# Patient Record
Sex: MALE | Race: BLACK OR AFRICAN AMERICAN | NOT HISPANIC OR LATINO | Employment: OTHER | URBAN - METROPOLITAN AREA
[De-identification: names, ages, dates, MRNs, and addresses within clinical notes are randomized per-mention and may not be internally consistent; named-entity substitution may affect disease eponyms.]

---

## 2017-03-17 ENCOUNTER — HISTORICAL (OUTPATIENT)
Dept: LAB | Facility: HOSPITAL | Age: 72
End: 2017-03-17

## 2017-08-29 ENCOUNTER — HISTORICAL (OUTPATIENT)
Dept: ADMINISTRATIVE | Facility: HOSPITAL | Age: 72
End: 2017-08-29

## 2017-08-29 LAB
ABS NEUT (OLG): 1.53 X10(3)/MCL (ref 2.1–9.2)
ALBUMIN SERPL-MCNC: 3.9 GM/DL (ref 3.4–5)
ALBUMIN/GLOB SERPL: 1.3 {RATIO}
ALP SERPL-CCNC: 71 UNIT/L (ref 50–136)
ALT SERPL-CCNC: 26 UNIT/L (ref 12–78)
AST SERPL-CCNC: 13 UNIT/L (ref 15–37)
BASOPHILS # BLD AUTO: 0 X10(3)/MCL (ref 0–0.2)
BASOPHILS NFR BLD AUTO: 0.6 %
BILIRUB SERPL-MCNC: 0.8 MG/DL (ref 0.2–1)
BILIRUBIN DIRECT+TOT PNL SERPL-MCNC: 0.2 MG/DL (ref 0–0.2)
BILIRUBIN DIRECT+TOT PNL SERPL-MCNC: 0.6 MG/DL (ref 0–0.8)
BUN SERPL-MCNC: 12 MG/DL (ref 7–18)
CALCIUM SERPL-MCNC: 9.1 MG/DL (ref 8.5–10.1)
CHLORIDE SERPL-SCNC: 106 MMOL/L (ref 98–107)
CO2 SERPL-SCNC: 29 MMOL/L (ref 21–32)
CREAT SERPL-MCNC: 1.15 MG/DL (ref 0.7–1.3)
EOSINOPHIL # BLD AUTO: 0.1 X10(3)/MCL (ref 0–0.9)
EOSINOPHIL NFR BLD AUTO: 4.3 %
EOSINOPHIL NFR BLD MANUAL: 2 % (ref 0–8)
ERYTHROCYTE [DISTWIDTH] IN BLOOD BY AUTOMATED COUNT: 13 % (ref 11.5–17)
GLOBULIN SER-MCNC: 3 GM/DL (ref 2.4–3.5)
GLUCOSE SERPL-MCNC: 103 MG/DL (ref 74–106)
HCT VFR BLD AUTO: 43.7 % (ref 42–52)
HGB BLD-MCNC: 14.9 GM/DL (ref 14–18)
LYMPHOCYTES # BLD AUTO: 1.1 X10(3)/MCL (ref 0.6–4.6)
LYMPHOCYTES NFR BLD AUTO: 35.4 %
LYMPHOCYTES NFR BLD MANUAL: 38 % (ref 13–40)
MCH RBC QN AUTO: 31.8 PG (ref 27–31)
MCHC RBC AUTO-ENTMCNC: 34.1 GM/DL (ref 33–36)
MCV RBC AUTO: 93.2 FL (ref 80–94)
MONOCYTES # BLD AUTO: 0.4 X10(3)/MCL (ref 0.1–1.3)
MONOCYTES NFR BLD AUTO: 12.1 %
MONOCYTES NFR BLD MANUAL: 10 % (ref 2–11)
NEUTROPHILS # BLD AUTO: 1.5 X10(3)/MCL (ref 2.1–9.2)
NEUTROPHILS NFR BLD AUTO: 47.6 %
NEUTROPHILS NFR BLD MANUAL: 50 % (ref 47–80)
PLATELET # BLD AUTO: 165 X10(3)/MCL (ref 130–400)
PLATELET # BLD EST: NORMAL 10*3/UL
PMV BLD AUTO: 10 FL (ref 9.4–12.4)
POTASSIUM SERPL-SCNC: 4.1 MMOL/L (ref 3.5–5.1)
PROT SERPL-MCNC: 6.9 GM/DL (ref 6.4–8.2)
RBC # BLD AUTO: 4.69 X10(6)/MCL (ref 4.7–6.1)
RBC MORPH BLD: NORMAL
SODIUM SERPL-SCNC: 142 MMOL/L (ref 136–145)
WBC # SPEC AUTO: 3.2 X10(3)/MCL (ref 4.5–11.5)

## 2017-09-06 ENCOUNTER — HISTORICAL (OUTPATIENT)
Dept: LAB | Facility: HOSPITAL | Age: 72
End: 2017-09-06

## 2017-09-06 LAB
BUN SERPL-MCNC: 17.9 MG/DL (ref 7–18)
CALCIUM SERPL-MCNC: 8.8 MG/DL (ref 8.5–10.1)
CHLORIDE SERPL-SCNC: 107 MMOL/L (ref 98–107)
CO2 SERPL-SCNC: 26.9 MMOL/L (ref 21–32)
CREAT SERPL-MCNC: 1.22 MG/DL (ref 0.6–1.3)
ERYTHROCYTE [DISTWIDTH] IN BLOOD BY AUTOMATED COUNT: 13.3 % (ref 11.5–17)
GLUCOSE SERPL-MCNC: 110 MG/DL (ref 74–106)
HCT VFR BLD AUTO: 42.3 % (ref 42–52)
HGB BLD-MCNC: 14.5 GM/DL (ref 14–18)
MCH RBC QN AUTO: 31.8 PG (ref 27–31)
MCHC RBC AUTO-ENTMCNC: 34.1 GM/DL (ref 33–36)
MCV RBC AUTO: 93 FL (ref 80–94)
PLATELET # BLD AUTO: 162 X10(3)/MCL (ref 130–400)
PMV BLD AUTO: 8.1 FL (ref 7.4–10.4)
POTASSIUM SERPL-SCNC: 4 MMOL/L (ref 3.5–5.1)
RBC # BLD AUTO: 4.55 X10(6)/MCL (ref 4.7–6.1)
SODIUM SERPL-SCNC: 143 MMOL/L (ref 136–145)
WBC # SPEC AUTO: 2.9 X10(3)/MCL (ref 4.5–11.5)

## 2017-10-06 ENCOUNTER — HOSPITAL ENCOUNTER (OUTPATIENT)
Dept: RADIOLOGY | Facility: HOSPITAL | Age: 72
Discharge: HOME | End: 2017-10-06
Attending: PHYSICAL MEDICINE & REHABILITATION
Payer: MEDICARE

## 2017-10-06 ENCOUNTER — PATIENT MESSAGE (OUTPATIENT)
Dept: PHYSICAL MEDICINE AND REHAB | Facility: CLINIC | Age: 72
End: 2017-10-06

## 2017-10-06 ENCOUNTER — OFFICE VISIT (OUTPATIENT)
Dept: PHYSICAL MEDICINE AND REHAB | Facility: CLINIC | Age: 72
End: 2017-10-06
Payer: MEDICARE

## 2017-10-06 VITALS
WEIGHT: 260 LBS | DIASTOLIC BLOOD PRESSURE: 78 MMHG | RESPIRATION RATE: 14 BRPM | BODY MASS INDEX: 33.37 KG/M2 | HEART RATE: 49 BPM | SYSTOLIC BLOOD PRESSURE: 138 MMHG | HEIGHT: 74 IN

## 2017-10-06 DIAGNOSIS — M70.50 PES ANSERINE BURSITIS: Primary | ICD-10-CM

## 2017-10-06 DIAGNOSIS — M70.50 PES ANSERINE BURSITIS: ICD-10-CM

## 2017-10-06 PROCEDURE — 99213 OFFICE O/P EST LOW 20 MIN: CPT | Mod: PBBFAC,PO,25 | Performed by: PHYSICAL MEDICINE & REHABILITATION

## 2017-10-06 PROCEDURE — 99214 OFFICE O/P EST MOD 30 MIN: CPT | Mod: S$PBB,,, | Performed by: PHYSICAL MEDICINE & REHABILITATION

## 2017-10-06 PROCEDURE — 73562 X-RAY EXAM OF KNEE 3: CPT | Mod: TC,50,PO

## 2017-10-06 PROCEDURE — 99999 PR PBB SHADOW E&M-EST. PATIENT-LVL III: CPT | Mod: PBBFAC,,, | Performed by: PHYSICAL MEDICINE & REHABILITATION

## 2017-10-06 PROCEDURE — 73562 X-RAY EXAM OF KNEE 3: CPT | Mod: 26,RT,, | Performed by: RADIOLOGY

## 2017-10-06 PROCEDURE — 73562 X-RAY EXAM OF KNEE 3: CPT | Mod: 26,LT,, | Performed by: RADIOLOGY

## 2017-10-06 RX ORDER — NABUMETONE 500 MG/1
500 TABLET, FILM COATED ORAL 2 TIMES DAILY
Qty: 60 TABLET | Refills: 1 | Status: SHIPPED | OUTPATIENT
Start: 2017-10-06 | End: 2018-09-25

## 2017-10-06 RX ORDER — CLONIDINE HYDROCHLORIDE 0.1 MG/1
TABLET ORAL
Refills: 11 | COMMUNITY
Start: 2017-09-18

## 2017-10-06 NOTE — PATIENT INSTRUCTIONS
What is bursitis?  A bursa is a fluid-filled sac that helps cushion the muscles, tendons, and bones around a joint. When a bursa becomes inflamed, its called bursitis. Common symptoms of bursitis include pain, tenderness, and swelling that limits movement of the joint.  What causes bursitis?  Bursitis is most often caused by overuse of a joint. The repeated movements irritate the bursa and may cause it to swell. When that happens, other surrounding tissues may become inflamed or have less space to move. Bursitis is most common in large joints such as the knee, shoulder, and hip.       Nonsurgical treatment involves both rest and exercise.    How is bursitis treated?  To help reduce pain and swelling, your healthcare provider may recommend one or more of the following:   · Rest gives the bursa time to heal. This means limiting activities that put stress on the joint.  · Anti-inflammatory medications help reduce painful swelling. In some cases, this can include injections of cortisone or other steroid medicines into the bursa.  · Splints and support bandages improve your comfort and allow the bursa to heal.  · Physical therapy may be used to increase flexibility and strengthen muscles that support the joint.  · Aspiration removes extra fluid from the bursa using a needle. This can help your healthcare provider find out what is causing your bursitis. For example, it might be an infection or overuse.   · Surgery can be used to remove an inflamed or infected bursa. This is rarely needed.  Date Last Reviewed: 9/11/2015  © 6326-8242 [employer]. [address] [place] [address], [place] [address]. All rights reserved. This information is not intended as a substitute for professional medical care. Always follow your healthcare professional's instructions.        Back Exercises: Knee Lift         To start, lie on your back with your knees bent and feet flat on the floor. Dont press your neck or lower back to the  floor. Breathe deeply. You should feel comfortable and relaxed in this position:  · Start by tightening your abdominal muscles.  · Lift one bent knee off the floor 2 to 4 inches.  · Hold for 10 seconds. Return to start position.  · Repeat 3 times.  · Switch legs.  Date Last Reviewed: 8/16/2015  © 5414-2559 [employer]. [address] [place] [address]. All rights reserved. This information is not intended as a substitute for professional medical care. Always follow your healthcare professional's instructions.        Quad Set for Leg and Knee    This exercise is designed to stretch and strengthen your knee. Before beginning, read through all the instructions. While exercising, breathe normally and use smooth movements. If you feel any pain, stop the exercise. If pain persists, call your healthcare provider.  1.  Sit on the floor with one leg straight, the other bent.  2.  Flex the foot of your straight leg by pointing your toes toward you. Press the back of your knee into the floor while tightening the muscle on the top of your thigh. Hold for ______ seconds. Then relax.  3.  Repeat ______ times. Do ______ sets a day.  Caution  · Dont arch your back.  · Dont hunch your shoulders.  Date Last Reviewed: 9/20/2015 © 2000-2017 [employer]. [address] [place] [address]. All rights reserved. This information is not intended as a substitute for professional medical care. Always follow your healthcare professional's instructions.        Hamstring Curls    The following exercise helps build strong, balanced leg muscles. Make sure to adjust exercise machines as instructed by your physical therapist. He or she will tell you how many times to do the exercise.  Note: To prevent injury, always warm up and stretch before your strengthening exercises. Stop any exercise that causes pain. Discuss it with your physical therapist or doctor.  · Lying on your stomach, pull one leg up as  far as you comfortably can.  · Let your leg uncurl slowly and steadily.  · Take care not to arch your back.  Date Last Reviewed: 8/16/2015 © 2000-2017 [employer]. [address] [place] [address]. All rights reserved. This information is not intended as a substitute for professional medical care. Always follow your healthcare professional's instructions.        Leg and Knee Exercises: Heel Raise    This exercise is designed to strengthen your knee and calf. Before beginning, read through all the instructions. While exercising, breathe normally and use smooth movements. If you feel any pain, stop the exercise. If pain persists, inform your healthcare provider.  Caution  · Dont lock your knees.  · Dont arch your back.   · Stand with both feet flat on the floor, shoulder-width apart.  · If you need support, steady yourself with your hand on a ledge, wall, or table.  · Raise both heels so youre standing on your toes.  · Hold for ______ seconds. Slowly lower your heels to the floor.  · Repeat ______ times. Do ______ sets a day.  Note: As you become stronger, stand on one foot at a time, and raise that heel off the floor.  Date Last Reviewed: 8/16/2015 © 2000-2017 [employer]. [address] [place] [address]. All rights reserved. This information is not intended as a substitute for professional medical care. Always follow your healthcare professional's instructions.        Leg and Knee Exercises: Leg Raise    This exercise is designed to stretch and strengthen your knee. Before beginning, read through all the instructions. While exercising, breathe normally and use smooth movements. If you feel any pain, stop the exercise. If pain persists, call your healthcare provider.  Caution  · Dont arch your back.  · Dont hunch your shoulders.   · Sit on the floor with your_________ leg straight, the other bent.  · Tighten the thigh muscles on the top of your straight leg. You  should feel the muscles contract. Raise that leg 6-8 inches. Then lower it slowly and steadily to the floor. Relax.  · Repeat ______ times.  Do ______ sets a day.  Date Last Reviewed: 8/16/2015  © 8807-5803 [employer]. [address] [place] [address]. All rights reserved. This information is not intended as a substitute for professional medical care. Always follow your healthcare professional's instructions.        Gastrocnemius Stretch (Flexibility)    1. Stand facing a wall from 3 feet away. Take one step toward the wall with your right foot.  2. Place both palms on the wall. Bend your right knee.  3. Lean forward, keeping the left leg straight and the left heel on the floor.  4. Hold for 30 to 60 seconds. Repeat 2 times, or as instructed.  5. Switch legs and repeat.  Date Last Reviewed: 3/10/2016  © 8682-1389 [employer]. [address] [place] [address]. All rights reserved. This information is not intended as a substitute for professional medical care. Always follow your healthcare professional's instructions.        Hamstring Stretch (Flexibility)    6. Sit on the floor with your right leg straight in front of you. Bend your left leg so the sole of your foot rests against the inside of your right thigh.  7. Reach toward your ankle. Keep your knee, neck, and back straight. Feel the stretch in the back of your thigh.  8. Hold for 30 to 60 seconds. Repeat 2 times, or as instructed.  9. Switch legs and repeat.  10. Repeat this exercise 3 times per day, or as instructed.     Tip: Dont bounce while youre stretching.   Date Last Reviewed: 3/10/2016  © 3613-3269 [employer]. [address] [place] [address]. All rights reserved. This information is not intended as a substitute for professional medical care. Always follow your healthcare professional's instructions.        Heel Slides    This exercise is for an injured right knee. Switch sides if  the injury is to your left knee.  11. Sit on the floor with your legs straight in front of you.  12. Slide your right heel along the floor toward you, bending your knee and keeping your foot flexed.  13. Move it as close to you as you comfortably can. Hold for 5 to 10 seconds. Then slide your heel back.  14. Repeat 5 times, or as instructed.     Tip: If youre sitting on carpet, put a plastic bag under your heel to make it slide more easily. If youre sitting on a hard floor, put a small towel under your heel.   Date Last Reviewed: 3/10/2016  © 8386-6002 [employer]. [address] [place] [address]. All rights reserved. This information is not intended as a substitute for professional medical care. Always follow your healthcare professional's instructions.        Hip Adduction (Strength)    These instructions are for your right foot. Switch sides for your left foot.  15. Lie on your right side on the floor. Keep your right leg straight. Bend your left leg and put your left foot flat on the floor behind your right knee.  16. Raise your right leg as high as you comfortably can. Hold for 5 seconds, then lower it back down.  17. Repeat 10 times, or as instructed.  18. Switch legs and repeat.  Date Last Reviewed: 3/10/2016  © 1964-6118 [employer]. [address] [place] [address]. All rights reserved. This information is not intended as a substitute for professional medical care. Always follow your healthcare professional's instructions.        Hip Adductor Stretch (Flexibility)    19. Sit on the floor. Put the soles of your feet together so your knees are pointed outward.  20. Pull your heels in toward your groin, as close as is comfortable.  21. Put your hands on your knees, and gently push them closer to the floor.  22. Hold for 30 to 60 seconds.  23. Relax and repeat 2 times, or as instructed.  24. Repeat this exercise 3 times a day, or as instructed.  Date Last  Reviewed: 3/10/2016  © 6496-1474 [employer] [address] [place] [address]. All rights reserved. This information is not intended as a substitute for professional medical care. Always follow your healthcare professional's instructions.        Hip Extension (Strength)    25. Lie on your back on the floor with your knees bent and feet flat on the floor. Put your arms at your side, palms flat on the floor.  26. Tighten your core muscles and keep them tight through the whole exercise.  27. Push down on your feet and raise your hips and lift your buttocks off the floor.  28. Dont arch your back.  29. Hold for 5 seconds.  30. Lower your buttocks back down to the floor.  31. Repeat 5 to 10 times, or as instructed.  Date Last Reviewed: 3/10/2016  © 3006-4567 [employer] [address] [place] [address]. All rights reserved. This information is not intended as a substitute for professional medical care. Always follow your healthcare professional's instructions.

## 2017-10-06 NOTE — PROGRESS NOTES
PM&R CLINIC NOTE    Chief Complaint   Patient presents with    Knee Pain     left     HPI: This is a 72 y.o. year old male being seen in clinic today for follow up left knee pain over the past few days.  He reports digging a trench to help with water flow at his home.  Since then he has achy pain at his medial knee-worse with prolonged activity.  Warm soaks with epsom salt have provided relief.  He denies weakness or instability    ROS  General- denies lethargy, weight change, fever, chills  Head/neck- denies swallowing difficulties  ENT- denies hearing changes  Cardiovascular-denies chest pain +h.o HTN  Pulmonary- denies shortness of breath  GI- denies constipation or bowel incontinence  - denies bladder incontinence  Skin- denies wounds or rashes  Musculoskeletal- denies weakness, +pain  Neurologic- denies numbness and tingling  Psychiatric- denies depressive or psychotic features, denies anxiety  Lymphatic-denies swelling  Endocrine- denies hypoglycemic symptoms/DM history    Physical Examination:    General: Well developed, well nourished male, NAD  HEENT: NCAT EOMI  Pulmonary: Normal respirations    Spinal Examination: CERVICAL  Active ROM is limited in extension due to tightness  Inspection: No deformity of spinal alignment.    Lumbar examination  Active ROM wnl  No deformity of spinal alignment, surgical scar at mildline spine    Bilateral upper and lower Extremities:  Shoulder/Elbow/Wrist/Hand ROM improved rom in bilateral shoulders improve posture   Hip/Knee/Ankle ROM wnl except limited knee ext on left-ttp at med jt line, ttp at left pes anserine bursa, min edema, no erythema, mild ttp at right med knee joint, no instability, min crepitus  Bilateral Extremities show normal capillary refill. No signs of cyanosis, rubor, edema, skin changes, or dysvascular changes of appendages. Nails appear intact.    No focal atrophy is noted of either upper or lower extremity.  Gait: antalgic with single tip cane  today    IMPRESSION/PLAN: This is a 72 y.o. year old male with myofascial pain-borderline fibromyalgia, cervical and lumbar DJD/DDD, s/p lumbar decompression and fusion, left pes anserine bursitis, probable knee DJD    1. Handouts on knee exercises and stretches provided  2. Cont ice/heat modalities prn, elevation and compression   3. Xray knees today  4. Nabumetone 500mg BID      Eliane Grigsby M.D.  Physical Medicine and Rehab

## 2018-01-24 ENCOUNTER — HISTORICAL (OUTPATIENT)
Dept: LAB | Facility: HOSPITAL | Age: 73
End: 2018-01-24

## 2018-01-24 LAB
BUN SERPL-MCNC: 17.4 MG/DL (ref 7–18)
CALCIUM SERPL-MCNC: 8.9 MG/DL (ref 8.5–10.1)
CHLORIDE SERPL-SCNC: 105 MMOL/L (ref 98–107)
CO2 SERPL-SCNC: 27.6 MMOL/L (ref 21–32)
CREAT SERPL-MCNC: 1.16 MG/DL (ref 0.6–1.3)
ERYTHROCYTE [DISTWIDTH] IN BLOOD BY AUTOMATED COUNT: 13 % (ref 11.5–17)
GLUCOSE SERPL-MCNC: 101 MG/DL (ref 74–106)
HCT VFR BLD AUTO: 41.9 % (ref 42–52)
HGB BLD-MCNC: 14.2 GM/DL (ref 14–18)
MCH RBC QN AUTO: 31.4 PG (ref 27–31)
MCHC RBC AUTO-ENTMCNC: 33.8 GM/DL (ref 33–36)
MCV RBC AUTO: 92.9 FL (ref 80–94)
PLATELET # BLD AUTO: 180 X10(3)/MCL (ref 130–400)
PMV BLD AUTO: 9.1 FL (ref 7.4–10.4)
POTASSIUM SERPL-SCNC: 4.2 MMOL/L (ref 3.5–5.1)
RBC # BLD AUTO: 4.51 X10(6)/MCL (ref 4.7–6.1)
SODIUM SERPL-SCNC: 140 MMOL/L (ref 136–145)
WBC # SPEC AUTO: 3.4 X10(3)/MCL (ref 4.5–11.5)

## 2018-06-22 ENCOUNTER — HISTORICAL (OUTPATIENT)
Dept: LAB | Facility: HOSPITAL | Age: 73
End: 2018-06-22

## 2018-09-03 ENCOUNTER — HISTORICAL (OUTPATIENT)
Dept: LAB | Facility: HOSPITAL | Age: 73
End: 2018-09-03

## 2018-09-18 ENCOUNTER — PATIENT MESSAGE (OUTPATIENT)
Dept: PHYSICAL MEDICINE AND REHAB | Facility: CLINIC | Age: 73
End: 2018-09-18

## 2018-09-18 DIAGNOSIS — G89.29 CHRONIC PAIN OF BOTH KNEES: Primary | ICD-10-CM

## 2018-09-18 DIAGNOSIS — M17.0 PRIMARY OSTEOARTHRITIS OF BOTH KNEES: Primary | ICD-10-CM

## 2018-09-18 DIAGNOSIS — M25.561 CHRONIC PAIN OF BOTH KNEES: Primary | ICD-10-CM

## 2018-09-18 DIAGNOSIS — M25.562 CHRONIC PAIN OF BOTH KNEES: Primary | ICD-10-CM

## 2018-09-25 ENCOUNTER — HOSPITAL ENCOUNTER (OUTPATIENT)
Dept: RADIOLOGY | Facility: HOSPITAL | Age: 73
Discharge: HOME | End: 2018-09-25
Attending: PHYSICIAN ASSISTANT
Payer: MEDICARE

## 2018-09-25 ENCOUNTER — OFFICE VISIT (OUTPATIENT)
Dept: ORTHOPEDICS | Facility: CLINIC | Age: 73
End: 2018-09-25
Payer: MEDICARE

## 2018-09-25 VITALS
WEIGHT: 259.94 LBS | DIASTOLIC BLOOD PRESSURE: 78 MMHG | SYSTOLIC BLOOD PRESSURE: 135 MMHG | HEART RATE: 54 BPM | BODY MASS INDEX: 33.36 KG/M2 | HEIGHT: 74 IN

## 2018-09-25 DIAGNOSIS — M17.11 ARTHRITIS OF RIGHT KNEE: Primary | ICD-10-CM

## 2018-09-25 DIAGNOSIS — M25.562 CHRONIC PAIN OF BOTH KNEES: ICD-10-CM

## 2018-09-25 DIAGNOSIS — G89.29 CHRONIC PAIN OF BOTH KNEES: ICD-10-CM

## 2018-09-25 DIAGNOSIS — M25.561 CHRONIC PAIN OF BOTH KNEES: ICD-10-CM

## 2018-09-25 PROCEDURE — 73564 X-RAY EXAM KNEE 4 OR MORE: CPT | Mod: 26,50,, | Performed by: RADIOLOGY

## 2018-09-25 PROCEDURE — 99214 OFFICE O/P EST MOD 30 MIN: CPT | Mod: PBBFAC,25,PO | Performed by: PHYSICIAN ASSISTANT

## 2018-09-25 PROCEDURE — 99999 PR PBB SHADOW E&M-EST. PATIENT-LVL IV: CPT | Mod: PBBFAC,,, | Performed by: PHYSICIAN ASSISTANT

## 2018-09-25 PROCEDURE — 99204 OFFICE O/P NEW MOD 45 MIN: CPT | Mod: S$PBB,,, | Performed by: PHYSICIAN ASSISTANT

## 2018-09-25 PROCEDURE — 73564 X-RAY EXAM KNEE 4 OR MORE: CPT | Mod: TC,50,FY,PO

## 2018-09-25 RX ORDER — PETROLATUM,WHITE/LANOLIN
OINTMENT (GRAM) TOPICAL 2 TIMES DAILY
COMMUNITY

## 2018-09-25 RX ORDER — CLOBETASOL PROPIONATE 0.5 MG/G
CREAM TOPICAL 3 TIMES DAILY
COMMUNITY

## 2018-09-25 RX ORDER — MELOXICAM 15 MG/1
15 TABLET ORAL DAILY
Qty: 30 TABLET | Refills: 1 | Status: SHIPPED | OUTPATIENT
Start: 2018-09-25

## 2018-09-25 RX ORDER — OLMESARTAN MEDOXOMIL 40 MG/1
40 TABLET ORAL DAILY
COMMUNITY

## 2018-09-25 RX ORDER — MULTIVIT WITH IRON,MINERALS
TABLET ORAL 2 TIMES DAILY
COMMUNITY

## 2018-09-25 NOTE — LETTER
September 25, 2018      Eliane Grigsby MD  [address] [place] [address]           [facility]  [address] [facility] [place] [address]  Phone: [phone]  Fax: [phone]          Patient: Yonathan Gonzales   MR Number: [id number]   YOB: 1945   Date of Visit: 9/25/2018       Dear Dr. Eliane Grigsby:    Thank you for referring Yonathan Gonzales to me for evaluation. Attached you will find relevant portions of my assessment and plan of care.    If you have questions, please do not hesitate to call me. I look forward to following Yonathan Gonzales along with you.    Sincerely,    Jaden Virk PA-C    Enclosure  CC:  No Recipients    If you would like to receive this communication electronically, please contact [email or web address] or [phone] to request more information on Feedback Link access.    For providers and/or their staff who would like to refer a patient to [facility], please contact us through our one-stop-shop provider referral line, [facility] , at [phone].    If you feel you have received this communication in error or would no longer like to receive these types of communications, please e-mail [email or web address]

## 2018-09-25 NOTE — PROGRESS NOTES
CC:  73-year-old male right knee pain    HPI:  Right knee pain off and on for several weeks now.  He denies any trauma to the area. The knee was swollen however has improved.  He was seen by his PCP at the [facility] yesterday.  No new medication given.  Pain level has improved today to a 4 on a 10 scale.    PMH:    Past Medical History:   Diagnosis Date    HLD (hyperlipidemia)     HTN (hypertension)     Prostate cancer        PSH:    Past Surgical History:   Procedure Laterality Date    FOOT SURGERY      spurs; bilateral    SHOULDER SURGERY      left       Family Hx:    Family History   Problem Relation Age of Onset    Cancer Unknown        Allergy:    Review of patient's allergies indicates:   Allergen Reactions    Zanaflex [tizanidine]      Drowsiness, dry mouth, constipation       Medication:    Current Outpatient Medications:     amlodipine (NORVASC) 5 MG tablet, Take 10 mg by mouth once daily. , Disp: , Rfl:     aspirin (ECOTRIN) 81 MG EC tablet, Take 81 mg by mouth once daily., Disp: , Rfl:     CHLOREL/CHLOROPH/D3/B2/FA/IRON (CHLORELLA CAPS ORAL), Take by mouth., Disp: , Rfl:     clobetasol (TEMOVATE) 0.05 % cream, Apply topically 3 (three) times daily., Disp: , Rfl:     cloNIDine (CATAPRES) 0.1 MG tablet, TK 1 T PO QPM, Disp: , Rfl: 11    co-enzyme Q-10 30 mg capsule, Take 100 mg by mouth once daily. , Disp: , Rfl:     cod liver oil Cap, Take by mouth 2 (two) times daily., Disp: , Rfl:     furosemide (LASIX) 40 MG tablet, Take 40 mg by mouth 2 (two) times daily., Disp: , Rfl:     glucosamine sulfate 1,000 mg Cap, Take by mouth 2 (two) times daily., Disp: , Rfl:     green tea leaf extract (GREEN TEA) 250 mg Cap, Take by mouth., Disp: , Rfl:     MULTIVIT-IRON-MIN-FOLIC ACID 3,500-18-0.4 UNIT-MG-MG ORAL CHEW, Take by mouth., Disp: , Rfl:     olmesartan (BENICAR) 40 MG tablet, Take 40 mg by mouth once daily., Disp: , Rfl:     diazepam (VALIUM) 5 MG tablet, Take 1 tablet (5 mg total) by mouth  "once. Take 20 min prior to MRI, must have , Disp: 1 tablet, Rfl: 0    meloxicam (MOBIC) 15 MG tablet, Take 1 tablet (15 mg total) by mouth once daily., Disp: 30 tablet, Rfl: 1    Social History:    Social History     Socioeconomic History    Marital status:      Spouse name: Not on file    Number of children: Not on file    Years of education: Not on file    Highest education level: Not on file   Social Needs    Financial resource strain: Not on file    Food insecurity - worry: Not on file    Food insecurity - inability: Not on file    Transportation needs - medical: Not on file    Transportation needs - non-medical: Not on file   Occupational History    Not on file   Tobacco Use    Smoking status: Former Smoker    Smokeless tobacco: Never Used   Substance and Sexual Activity    Alcohol use: Yes     Comment: seldome    Drug use: No    Sexual activity: Not on file   Other Topics Concern    Not on file   Social History Narrative    Not on file       Vitals:   /78 (BP Location: Left arm, Patient Position: Sitting, BP Method: Large (Automatic))   Pulse (!) 54   Ht 6' 2" (1.88 m)   Wt 117.9 kg (259 lb 14.8 oz)   BMI 33.37 kg/m²      ROS:  GENERAL: No fever, chills, fatigability or weight loss.  SKIN: No rashes, itching or changes in color or texture of skin.  HEAD: No headaches or recent head trauma.  EYES: Visual acuity fine. No photophobia, ocular pain or diplopia.  EARS: Denies ear pain, discharge or vertigo.  NOSE: No loss of smell, no epistaxis or postnasal drip.  MOUTH & THROAT: No hoarseness or change in voice. No excessive gum bleeding.  NODES: Denies swollen glands.  CHEST: Denies ALARCON, cyanosis, wheezing, cough and sputum production.  CARDIOVASCULAR: Denies chest pain, PND, orthopnea or reduced exercise tolerance.  ABDOMEN: Appetite fine. No weight loss. Denies diarrhea, abdominal pain, hematemesis or blood in stool.  URINARY: No flank pain, dysuria or " hematuria.  PERIPHERAL VASCULAR: No claudication or cyanosis.  NEUROLOGIC: No history of seizures, paralysis, alteration of gait or coordination.  MUSCULOSKELETAL: See HPI    PE:  APPEARANCE: Well nourished, well developed, in no acute distress.   HEAD: Normocephalic, atraumatic.  NEUROLOGIC: Cranial Nerves: II-XII grossly intact, also see MUSCULOSKELETAL  MUSCULOSKELETAL:   right Knee Exam-abnormal    Gait-abnormal  Muscle Appearance:normal  Spine Alignment-normal  Muscle Atrophy-Negative  Deformities-Negative  Tenderness-Negative  Paresthesias-Negative  Range of Motion         Ext-normal, 0 degrees         Flex-normal  Muscle Strength-normal  Sensation-normal  Reflexes-normal  Crepitus-Positive                                Swelling-Negative  Effusion- Negative                                Edema-Negative  Lachman-Negative                                Erythema-Negative  Vivi's-Negative                              Apley Grind-Positive  Patellar Comp-Negative                         Alignment-normal/symmetric  Patellar Apprehension-Negative              Synovial fullness-Negative  Passive Patellar Tilt-normal  Patellar Tracking-normal   Patellar Glide-normal  Q-Angle at 90 degrees-normal  Patellar Grind-abnormal  A-Gmza-Golxfrtb  Fatigue-Negative                                     HS Tightness-Negative  Tests on Exam, No ligamentous laxity  Neurovascular Status-normal+2 DP and PT artery pulses  Skin-normal    Assessment:           Diagnosis:              1.  Right knee arthritis                   Diagnostic Studies  MRI-No  X-Ray-Yes agree to findings of  Mild bilateral medial compartment joint space narrowing is again noted.  Bones remain intact without evidence of fracture or dislocation.  There is a joint effusion seen on either side, greater on the right.  Slight patellar tilt noted on the right    Plan:                                                 1. PT-no                                                  2.OT-no                                          3.NSAID-yes  Mobic                                      4. Narcotics-no                                     5. Wound care-N/A                                 6. Rest-no                                           7. Surgery-no                                         8. NERY Hose-no                                    9. Anticoagulation therapy-no               10. Elevation-no                                     11. Crutches-no                                    12. Walker-no             13. Cane no                        14. Referral-no                                     15.Injection-no                            16. Splint   /    Cast   /   Cast Shoe-Yes              17. RICE            18. Follow up-  as needed

## 2018-09-25 NOTE — PATIENT INSTRUCTIONS
Hylan G-F 20 intra-articular injection  What is this medicine?  HYLAN G-F 20 (HI dino G F 20) is used to treat osteoarthritis of the knee. It lubricates and cushions the joint, reducing pain in the knee.  How should I use this medicine?  This medicine is for injection into the knee joint. It is given by a health care professional in a hospital or clinic setting.  Talk to your pediatrician regarding the use of this medicine in children. This medicine is not approved for use in children.  What side effects may I notice from receiving this medicine?  Side effects that you should report to your doctor or health care professional as soon as possible:  · allergic reactions like skin rash, itching or hives, swelling of the face, lips, or tongue  · difficulty breathing  · fever or chills  · severe joint pain or swelling  · unusual bleeding or bruising  Side effects that usually do not require medical attention (Report these to your doctor or health care professional if they continue or are bothersome.):  · dizziness  · flushing  · general ill feeling or flu-like symptoms  · headache  · minor joint pain or swelling  · muscle pain or cramps  · pain, redness, irritation or bruising at site of injection  What may interact with this medicine?  Do not take this medicine with any of the following medications:  · other injections for the joint like steroids or anesthetics  · certain skin disinfectants like benzalkonium chloride  What if I miss a dose?  Keep appointments for follow-up doses as directed. For Synvisc, you will need weekly injections for 3 doses. It is important not to miss your dose. If you will receive Synvisc-One, then only 1 injection will be needed. Call your doctor or health care professional if you are unable to keep an appointment.  Where should I keep my medicine?  This drug is given in a hospital or clinic and will not be stored at home.  What should I tell my health care provider before I take this  medicine?  They need to know if you have any of these conditions:  · severe knee inflammation  · skin conditions or sensitivity  · skin or joint infection  · venous stasis  · an unusual or allergic reaction to hylan G-F 20, hyaluronan (sodium hyaluronate), eggs, other medicines, foods, dyes, or preservatives  · pregnant or trying to get pregnant  · breast-feeding  What should I watch for while using this medicine?  Tell your doctor or healthcare professional if your symptoms do not start to get better or if they get worse. Your condition will be monitored carefully while you are receiving this medicine. Most persons get pain relief for up to 6 months after treatment.  Avoid strenuous activities (high-impact sports, jogging) or major weight-bearing activities for 48 hours after the injection.  NOTE:This sheet is a summary. It may not cover all possible information. If you have questions about this medicine, talk to your doctor, pharmacist, or health care provider. Copyright© 2017 [employer]        Osteoarthritis  Osteoarthritis (also called degenerative joint disease) happens when the cartilage in a joint becomes damaged and worn. This may be due to age, wear and tear, overuse of the joint, or other problems. Osteoarthritis can affect any joint. But it is most common in hands, knees, spine, hips, and feet. Symptoms include joint stiffness, pain, and swelling.  Home care  · When a joint is more sore than usual, rest it for a day or two.  · Heat can help relieve stiffness. Take a hot bath or apply a heating pad for up to 30 minutes at a time. If symptoms are worse in the morning, using heat just after awakening can help relax the muscle and soothe the joints.   · Ice helps relieve pain and swelling. It is often used after activity. Use a cold pack wrapped in a thin cloth on the joint for 10 to 15 minutes at a time.   · Alternating hot and cold can also help relieve pain. Try this for 20 minutes at a time, several  times per day.  · Exercise helps prevent the muscles and ligaments around the joint from becoming weak. It also helps maintain function in the joint.  Be as active as you can. Talk to your healthcare provider about what activity program is best for you.  · Excess weight puts a lot of extra strain on weight-bearing joints of the lower back, hips, knees, feet and ankles. If you are overweight, talk to your healthcare provider about a safe and effective weight loss program.  · Use anti-inflammatory medicines as prescribed for pain. This includes acetaminophen or NSAIDs such as ibuprofen or naproxen. If needed, topical or injected medicines may be recommended. Talk to your healthcare provider if these options are not enough to manage your pain.  · Talk with your healthcare provider about devices that might help improve your function and reduce pain.  Follow-up care  Follow up with your healthcare provider as advised by our staff.  When to seek medical advice  Call your healthcare provider right away if any of these occur:  · Redness or swelling of a painful joint  · Discharge or pus from a painful joint  · Fever of 100.4ºF (38ºC) or higher, or as directed by your healthcare provider  · Worsening joint pain  · Decreased ability to move the joint or bear weight on the joint  Date Last Reviewed: 3/1/2017  © 7329-9969 [employer] [address] [place] [address], [place] [address]. All rights reserved. This information is not intended as a substitute for professional medical care. Always follow your healthcare professional's instructions.

## 2018-11-17 ENCOUNTER — HISTORICAL (OUTPATIENT)
Dept: LAB | Facility: HOSPITAL | Age: 73
End: 2018-11-17

## 2018-11-26 ENCOUNTER — PATIENT MESSAGE (OUTPATIENT)
Dept: PHYSICAL MEDICINE AND REHAB | Facility: CLINIC | Age: 73
End: 2018-11-26

## 2019-04-10 ENCOUNTER — HISTORICAL (OUTPATIENT)
Dept: LAB | Facility: HOSPITAL | Age: 74
End: 2019-04-10

## 2019-04-10 LAB
BUN SERPL-MCNC: 17.7 MG/DL (ref 7–18)
CALCIUM SERPL-MCNC: 9.4 MG/DL (ref 8.5–10.1)
CHLORIDE SERPL-SCNC: 105 MMOL/L (ref 98–107)
CO2 SERPL-SCNC: 29 MMOL/L (ref 21–32)
CREAT SERPL-MCNC: 1.27 MG/DL (ref 0.6–1.3)
CREAT/UREA NIT SERPL: 14
ERYTHROCYTE [DISTWIDTH] IN BLOOD BY AUTOMATED COUNT: 13.1 % (ref 11.5–17)
GLUCOSE SERPL-MCNC: 89 MG/DL (ref 74–106)
HCT VFR BLD AUTO: 42.2 % (ref 42–52)
HGB BLD-MCNC: 14.5 GM/DL (ref 14–18)
MCH RBC QN AUTO: 31.6 PG (ref 27–31)
MCHC RBC AUTO-ENTMCNC: 34.4 GM/DL (ref 33–36)
MCV RBC AUTO: 91.9 FL (ref 80–94)
PLATELET # BLD AUTO: 164 X10(3)/MCL (ref 130–400)
PMV BLD AUTO: 9.7 FL (ref 9.4–12.4)
POTASSIUM SERPL-SCNC: 4.5 MMOL/L (ref 3.5–5.1)
RBC # BLD AUTO: 4.59 X10(6)/MCL (ref 4.7–6.1)
SODIUM SERPL-SCNC: 140 MMOL/L (ref 136–145)
WBC # SPEC AUTO: 3.1 X10(3)/MCL (ref 4.5–11.5)

## 2019-10-07 ENCOUNTER — HISTORICAL (OUTPATIENT)
Dept: RADIOLOGY | Facility: HOSPITAL | Age: 74
End: 2019-10-07

## 2020-01-15 ENCOUNTER — HISTORICAL (OUTPATIENT)
Dept: LAB | Facility: HOSPITAL | Age: 75
End: 2020-01-15

## 2020-01-15 LAB
ALBUMIN SERPL-MCNC: 3.6 GM/DL (ref 3.4–5)
ALP SERPL-CCNC: 89 UNIT/L (ref 46–116)
ALT SERPL-CCNC: 36 UNIT/L (ref 12–78)
AST SERPL-CCNC: 20 UNIT/L (ref 15–37)
BILIRUB SERPL-MCNC: 0.5 MG/DL (ref 0.2–1)
BILIRUBIN DIRECT+TOT PNL SERPL-MCNC: 0.16 MG/DL (ref 0–0.2)
BILIRUBIN DIRECT+TOT PNL SERPL-MCNC: 0.34 MG/DL (ref 0–0.8)
CHOLEST SERPL-MCNC: 111 MG/DL (ref 0–200)
CHOLEST/HDLC SERPL: 2.6 {RATIO} (ref 0–5)
HDLC SERPL-MCNC: 43 MG/DL (ref 40–60)
LDLC SERPL CALC-MCNC: 51 MG/DL (ref 0–129)
PROT SERPL-MCNC: 6.8 GM/DL (ref 6.4–8.2)
TRIGL SERPL-MCNC: 85 MG/DL
VLDLC SERPL CALC-MCNC: 17 MG/DL

## 2020-02-24 ENCOUNTER — HISTORICAL (OUTPATIENT)
Dept: LAB | Facility: HOSPITAL | Age: 75
End: 2020-02-24

## 2020-09-24 ENCOUNTER — HISTORICAL (OUTPATIENT)
Dept: SURGERY | Facility: HOSPITAL | Age: 75
End: 2020-09-24

## 2020-11-04 ENCOUNTER — HISTORICAL (OUTPATIENT)
Dept: LAB | Facility: HOSPITAL | Age: 75
End: 2020-11-04

## 2021-03-16 ENCOUNTER — HISTORICAL (OUTPATIENT)
Dept: ADMINISTRATIVE | Facility: HOSPITAL | Age: 76
End: 2021-03-16

## 2021-03-30 ENCOUNTER — HISTORICAL (OUTPATIENT)
Dept: ADMINISTRATIVE | Facility: HOSPITAL | Age: 76
End: 2021-03-30

## 2021-07-07 ENCOUNTER — HISTORICAL (OUTPATIENT)
Dept: LAB | Facility: HOSPITAL | Age: 76
End: 2021-07-07

## 2021-07-07 LAB
ABS NEUT (OLG): 1.36 X10(3)/MCL (ref 2.1–9.2)
ALBUMIN SERPL-MCNC: 4.2 GM/DL (ref 3.4–4.8)
ALBUMIN/GLOB SERPL: 1.8 RATIO (ref 1.1–2)
ALP SERPL-CCNC: 68 UNIT/L (ref 40–150)
ALT SERPL-CCNC: 26 UNIT/L (ref 0–55)
AST SERPL-CCNC: 22 UNIT/L (ref 5–34)
BASOPHILS # BLD AUTO: 0 X10(3)/MCL (ref 0–0.2)
BASOPHILS NFR BLD AUTO: 0 %
BILIRUB SERPL-MCNC: 0.7 MG/DL
BILIRUBIN DIRECT+TOT PNL SERPL-MCNC: 0.3 MG/DL (ref 0–0.5)
BILIRUBIN DIRECT+TOT PNL SERPL-MCNC: 0.4 MG/DL (ref 0–0.8)
BUN SERPL-MCNC: 18.2 MG/DL (ref 8.4–25.7)
CALCIUM SERPL-MCNC: 9.3 MG/DL (ref 8.8–10)
CHLORIDE SERPL-SCNC: 104 MMOL/L (ref 98–107)
CO2 SERPL-SCNC: 28 MMOL/L (ref 23–31)
CREAT SERPL-MCNC: 1.24 MG/DL (ref 0.73–1.18)
CREAT UR-MCNC: 146.3 MG/DL (ref 58–161)
CREAT UR-MCNC: 146.3 MG/DL (ref 58–161)
DEPRECATED CALCIDIOL+CALCIFEROL SERPL-MC: 59.6 NG/ML (ref 30–80)
EOSINOPHIL # BLD AUTO: 0.1 X10(3)/MCL (ref 0–0.9)
EOSINOPHIL NFR BLD AUTO: 3 %
ERYTHROCYTE [DISTWIDTH] IN BLOOD BY AUTOMATED COUNT: 12.6 % (ref 11.5–17)
GLOBULIN SER-MCNC: 2.4 GM/DL (ref 2.4–3.5)
GLUCOSE SERPL-MCNC: 122 MG/DL (ref 82–115)
HCT VFR BLD AUTO: 42.9 % (ref 42–52)
HGB BLD-MCNC: 14.6 GM/DL (ref 14–18)
IMM GRANULOCYTES # BLD AUTO: 0.01 % (ref 0–0.02)
IMM GRANULOCYTES NFR BLD AUTO: 0.3 % (ref 0–0.43)
LYMPHOCYTES # BLD AUTO: 1.4 X10(3)/MCL (ref 0.6–4.6)
LYMPHOCYTES NFR BLD AUTO: 42 %
MAGNESIUM SERPL-MCNC: 2.1 MG/DL (ref 1.6–2.6)
MCH RBC QN AUTO: 31.9 PG (ref 27–31)
MCHC RBC AUTO-ENTMCNC: 34 GM/DL (ref 33–36)
MCV RBC AUTO: 93.7 FL (ref 80–94)
MONOCYTES # BLD AUTO: 0.4 X10(3)/MCL (ref 0.1–1.3)
MONOCYTES NFR BLD AUTO: 11 %
NEUTROPHILS # BLD AUTO: 1.36 X10(3)/MCL (ref 1.4–7.9)
NEUTROPHILS NFR BLD AUTO: 42 %
PLATELET # BLD AUTO: 178 X10(3)/MCL (ref 130–400)
PMV BLD AUTO: 9.6 FL (ref 9.4–12.4)
POTASSIUM SERPL-SCNC: 4 MMOL/L (ref 3.5–5.1)
PROT SERPL-MCNC: 6.6 GM/DL (ref 5.8–7.6)
PROT UR STRIP-MCNC: <6.8 MG/DL
PROT UR STRIP-MCNC: <6.8 MG/DL
PROT/CREAT UR-RTO: <46 MG/DL
PTH-INTACT SERPL-MCNC: 60.3 PG/ML (ref 8.7–77.1)
RBC # BLD AUTO: 4.58 X10(6)/MCL (ref 4.7–6.1)
SODIUM SERPL-SCNC: 140 MMOL/L (ref 136–145)
WBC # SPEC AUTO: 3.2 X10(3)/MCL (ref 4.5–11.5)

## 2022-01-04 ENCOUNTER — HISTORICAL (OUTPATIENT)
Dept: LAB | Facility: HOSPITAL | Age: 77
End: 2022-01-04

## 2022-01-04 LAB
ABS NEUT (OLG): 1.91 X10(3)/MCL (ref 2.1–9.2)
ALBUMIN SERPL-MCNC: 4 GM/DL (ref 3.4–4.8)
ALBUMIN/GLOB SERPL: 1.4 RATIO (ref 1.1–2)
ALP SERPL-CCNC: 81 UNIT/L (ref 40–150)
ALT SERPL-CCNC: 28 UNIT/L (ref 0–55)
AST SERPL-CCNC: 21 UNIT/L (ref 5–34)
BASOPHILS # BLD AUTO: 0 X10(3)/MCL (ref 0–0.2)
BASOPHILS NFR BLD AUTO: 0 %
BILIRUB SERPL-MCNC: 0.6 MG/DL
BILIRUBIN DIRECT+TOT PNL SERPL-MCNC: 0.2 MG/DL (ref 0–0.5)
BILIRUBIN DIRECT+TOT PNL SERPL-MCNC: 0.4 MG/DL (ref 0–0.8)
BUN SERPL-MCNC: 15 MG/DL (ref 8.4–25.7)
CALCIUM SERPL-MCNC: 9.5 MG/DL (ref 8.7–10.5)
CHLORIDE SERPL-SCNC: 103 MMOL/L (ref 98–107)
CO2 SERPL-SCNC: 29 MMOL/L (ref 23–31)
CREAT SERPL-MCNC: 1.17 MG/DL (ref 0.73–1.18)
EOSINOPHIL # BLD AUTO: 0.1 X10(3)/MCL (ref 0–0.9)
EOSINOPHIL NFR BLD AUTO: 4 %
ERYTHROCYTE [DISTWIDTH] IN BLOOD BY AUTOMATED COUNT: 13 % (ref 11.5–17)
GLOBULIN SER-MCNC: 2.9 GM/DL (ref 2.4–3.5)
GLUCOSE SERPL-MCNC: 108 MG/DL (ref 82–115)
HCT VFR BLD AUTO: 44.2 % (ref 42–52)
HGB BLD-MCNC: 14.6 GM/DL (ref 14–18)
IMM GRANULOCYTES # BLD AUTO: 0.01 % (ref 0–0.02)
IMM GRANULOCYTES NFR BLD AUTO: 0.3 % (ref 0–0.43)
LYMPHOCYTES # BLD AUTO: 1.5 X10(3)/MCL (ref 0.6–4.6)
LYMPHOCYTES NFR BLD AUTO: 37 %
MAGNESIUM SERPL-MCNC: 2.1 MG/DL (ref 1.6–2.6)
MCH RBC QN AUTO: 31.3 PG (ref 27–31)
MCHC RBC AUTO-ENTMCNC: 33 GM/DL (ref 33–36)
MCV RBC AUTO: 94.6 FL (ref 80–94)
MONOCYTES # BLD AUTO: 0.4 X10(3)/MCL (ref 0.1–1.3)
MONOCYTES NFR BLD AUTO: 10 %
NEUTROPHILS # BLD AUTO: 1.91 X10(3)/MCL (ref 1.4–7.9)
NEUTROPHILS NFR BLD AUTO: 48 %
PHOSPHATE SERPL-MCNC: 3 MG/DL (ref 2.3–4.7)
PLATELET # BLD AUTO: 178 X10(3)/MCL (ref 130–400)
PMV BLD AUTO: 9.6 FL (ref 9.4–12.4)
POTASSIUM SERPL-SCNC: 3.9 MMOL/L (ref 3.5–5.1)
PROT SERPL-MCNC: 6.9 GM/DL (ref 5.8–7.6)
PTH-INTACT SERPL-MCNC: 63.4 PG/ML (ref 8.7–77.1)
RBC # BLD AUTO: 4.67 X10(6)/MCL (ref 4.7–6.1)
SODIUM SERPL-SCNC: 140 MMOL/L (ref 136–145)
WBC # SPEC AUTO: 4 X10(3)/MCL (ref 4.5–11.5)

## 2022-01-26 ENCOUNTER — HISTORICAL (OUTPATIENT)
Dept: RADIOLOGY | Facility: HOSPITAL | Age: 77
End: 2022-01-26

## 2022-04-30 NOTE — OP NOTE
DATE OF SURGERY:    09/24/2020    SURGEON:  Toi Arambula MD    PREOPERATIVE DIAGNOSIS:  Erectile dysfunction.    POSTOPERATIVE DIAGNOSIS:  Erectile dysfunction.    PROCEDURE:  Inflatable penile prosthesis with Coloplast Titan device.    FINDINGS:  Corporal measurements were 9 cm distally and 12 cm proximally for a total length of 21 cm.  I chose 20 cm cylinders with 1 cm rear tip extender and a 75 cc reservoir.    DESCRIPTION OF PROCEDURE:  After informed consent was obtained, the patient was taken to the operating room.  After receiving preoperative doses of antibiotics, he was given a general anesthetic.  In supine position, his abdomen, genitals, perineum, and lower extremities above the knees were all prepped and draped in the usual sterile fashion.  I used a Dino deep scrotal retractor to place tension on the penis after a Ambrose catheter was placed through the urethra into the bladder.  I then used a #15 blade to make a horizontal penoscrotal incision and I divided the scrotal layers down to the corporal bodies as well as to expose the urethra to prevent any injury.  I used stay sutures of 2-0 Vicryl, which I put into the corpora on both sides at the level of the penoscrotal junction on each side of the midline.  I then used a 15 blade to make corporotomies on both sides.  I used a curved Metzenbaum scissors to make a first pass all the way into the distal corporal body under the glans, and I used the measuring device to dilate and measure the corpora.  His corpora measured 9 cm distally and 12 cm proximally for a total length of 21 cm.  I chose 20 cm Titan cylinders, which were appropriately prepped on the back table and soaked in concentrated rifampin and gentamicin.  I added a 1 cm rear tip extender on the proximal ends.  I then used the Nara  to pass the needle and strings through the distal corpora and glans on both sides.  I placed the proximal ends of the cylinders in place and  pulled the distal ends in place using the strings.  I used the previously placed stay sutures into the corpora to close the corporotomies.  At this time, I used the suction device to empty the bladder through the catheter.  I prepped a 75 cc reservoir which was soaked in concentrated rifampin and gentamicin.  I used a long nasal speculum to perforate the transversalis fascia above the left pubic tubercle and made an extraperitoneal pocket underneath the abdominal wall for the reservoir, which was placed and filled with 75 cc of isotonic saline.  I used a temporary reservoir to completely inflate the cylinders and they demonstrated appropriately sized cylinders and good function.  He did have significant prominent Peyronie disease and, after performing modeling technique, I got that within 10 degrees curvature.  Then using the quick connectors, I connected the reservoir to the pump which was placed in the dependent portion of the scrotum.  I used lots of antibiotic irrigation during the case.  I closed the scrotum with a running 2-0 Vicryl suture for the dartos fascia, followed by a running 4-0 Monocryl     subcuticular stitch for the skin.  He tolerated the procedure well.  There were no complications.  He was extubated and brought to recovery room in good condition.        ______________________________  MD ODILIA Meyers/UA  DD:  10/01/2020  Time:  07:16AM  DT:  10/01/2020  Time:  07:35AM  Job #:  [id number]

## 2022-04-30 NOTE — PROGRESS NOTES
Patient:   Yonathan Gonzales Sr            MRN: [id number]            FIN: [id number]               Age:   72 years     Sex:  Male     :  1945   Associated Diagnoses:   Prostate cancer; Chronic leukopenia   Author:   Renae Francois MD      Visit Information   HEMATOLOGY/ONCOLOGY VISIT    Diagnosis:   Chronic benign Leukopenia ()   Observation  Prostate Cancer ()   S/p XRT--> observation    Other MD's  Dr Palacios-Urologist  Dr Duong-Nephrologist  Dr Wei-PCP  Dr Harris-Cardiologist  Dr Eliane Grigsby-Rheumatologist ([facility])  Dr Eliazar Ornelas-[facility]    Clinical History: Mr. Gonzales is a pleasant 71 years old male who is followed for chronic benign neutropenia. He was first seen on 2004 upon referral from Dr. Ajay Duong who had been following the patient for hyperuricemia treated with allopurinol for approximately a year. At that time his WBC's were 3.2. The patient was evaluated with the lupus panel, TSH, rheumatoid factor, CMP, sedimentation rate, and C-reactive protein. He also had a bone marrow aspiration on 2004 that revealed normal cellular marrow with normal hematopoetic maturation and increased iron stores. Chromosome analysis was normal male karyotype. He was diagnosed with chronic benign neutropenia.    He was diagnosed by Dr. Palacios with Prostate cancer in 10/2012. He has a transrectal ultrasound of the prostate gland on 12 secondary to an elevated PSA. Ultrasound revealed an enlargement of the gland with multiple cystic foci. Hypoechoic, solid area in the right side towards the base and in hypoechoic focus in the left central zone. On 12 he had an ultrasound guided prostate biopsy that confirmed the diagnosis of prostate cancer. He Underwnet XRT and has been followed closely by Dr Palacios.      Chief Complaint   2017 9:13 CDT       none        Interval History   The pateint states that he is doing well with no infections. He does not have any  complains. [facility] and shortly after he sees Dr Palacios. He is still close followup but now q year. He has a good appetite and bowels move well with added fiber.  His son passed away and now he is taking care of his wife that has melanoma. Dr Mueller is her local doctor and she is seen at [facility] as well. He has not have any recent hospitalization. No fever, chills, sweats.      Review of Systems   Constitutional:  Negative except as noted above in interval history.    Eye:  Negative except as noted above in interval history.    Ear/Nose/Mouth/Throat:  Negative except as noted above in interval history.    Respiratory:  Negative except as noted above in interval history.    Cardiovascular:  Negative except as noted above in interval history.    Breast:  Negative except as noted above in interval history.    Gastrointestinal:  Negative except as noted above in interval history.    Genitourinary:  Negative except as noted above in interval history.    Hematology/Lymphatics:  Negative except as noted above in interval history.    Endocrine:  Negative except as noted above in interval history.    Immunologic:  Negative except as noted above in interval history.    Musculoskeletal:  Negative except as noted above in interval history.    Integumentary:  Negative except as noted above in interval history.    Neurologic:  Negative except as noted above in interval history.    Psychiatric:  Negative except as noted above in interval history.    All other systems.     Health Status   Allergies:    Allergic Reactions (All)  No Known Allergies,    Allergies (1) Active Reaction  No Known Allergies None Documented     Current medications:  (Selected)   Documented Medications  Documented  Lasix 40 mg oral tablet: 0 Refill(s)  TNF Medl (Template NonFormulary): 0 Refill(s)  TNF Medl (Template NonFormulary): 0 Refill(s)  TNF Medl (Template NonFormulary): 0 Refill(s)  amlodipine 5  mg oral tablet: 0 Refill(s)  aspirin 81 mg oral tablet, CHEWABLE: 0 Refill(s)  clobetasol 0.05% topical cream: 0 Refill(s)  cloniDINE 0.1 mg oral tablet: 0 Refill(s)  valsartan 320 mg oral tablet: 0 Refill(s)   Problem list:    All Problems  Obesity / SNOMED CT 8875735040 / Probable  Resolved: Arthritis / SNOMED CT 11OZ8766-9M2P-94B3-0J5X-DVW8H636T813  Resolved: HTN (hypertension) / SNOMED CT 9495LJ7W-6591-3244-2066-NSS576UT8844  Resolved: Hyperlipidemia / SNOMED CT 53102981  Resolved: Hyperuricemia / SNOMED CT CI0M6S96-MS16-65V1-XH00-W6639B8C86V4  Resolved: Renal disease / SNOMED CT 270614348,    Active Problems (1)  Obesity         Histories   Past Medical History:    Resolved  Arthritis (36CQ3426-1L7J-02B6-2O9R-OFZ8N801B892):  Resolved.  Hyperlipidemia (63987679):  Resolved.  HTN (hypertension) (5595IS0R-3104-2416-7480-GYE362DH2930):  Resolved.  Hyperuricemia (VG0D8K82-WX82-09O2-HV11-P2975G4M07C8):  Resolved.  Renal disease (476929382):  Resolved.   Family History:    Primary malignant neoplasm of breast  Sister  Diabetes mellitus type 2  Mother  CHF (congestive heart failure)....  Mother  Cancer....  Brother (Brother, )  Hypertension.  Mother     Procedure history:    cardiac cath on 2004 at 59 Years.  foot surgery.  hemorrhoidectomy.   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        Physical Examination   Vital Signs   2017 9:13 CDT       Temperature Oral          36.5 DegC                             Peripheral Pulse Rate     47 bpm  LOW                              Systolic Blood Pressure   134 mmHg                             Diastolic Blood Pressure  82 mmHg     Measurements from flowsheet : Measurements   8/29/2017 9:13 CDT       Weight Dosing             118.4 kg                             Weight Measured           118.4 kg                             Weight Measured and Calculated in Lbs     261.02 lb                             Height/Length Dosing      184.1 cm                             Height/Length Measured    184.1 cm                             BSA Measured              2.46 m2                             Body Mass Index Measured  34.93 kg/m2     General:  Alert and oriented, No acute distress.         Appearance: Well nourished, Well developed.    Eye:  Pupils are equal, round and reactive to light, Extraocular movements are intact, Normal conjunctiva.         Sclera: Not icteric.    HENT:  Normocephalic, Oral mucosa is moist, No pharyngeal erythema, No sinus tenderness.    Neck:  Supple, No jugular venous distention, No lymphadenopathy.    Respiratory:  Lungs are clear to auscultation, Respirations are non-labored.    Cardiovascular:  Normal rate, Regular rhythm, No murmur, No gallop, No edema.    Gastrointestinal:  Soft, Non-tender, Non-distended, Normal bowel sounds, No organomegaly.    Genitourinary:  No costovertebral angle tenderness, No inguinal tenderness.    Lymphatics:  No lymphadenopathy neck, axilla, groin.    Musculoskeletal:  Normal range of motion, Normal gait.    Integumentary:  Warm, Dry.    Neurologic:  Alert, Oriented, No focal deficits, Cranial Nerves II-XII are grossly intact.    Cognition and Speech:  Oriented, Speech clear and coherent, Functional cognition intact.    Psychiatric:  Cooperative, Appropriate mood & affect, Normal judgment.    ECOG Performance Scale: 0 - Fully active; no performance restrictions.      Review / Management   Radiology Reports:      Pathology Reports:        Results review:  Lab results   8/29/2017  8:45 CDT       WBC                       3.2 x10(3)/mcL  LOW                             RBC                       4.69 x10(6)/mcL  LOW                             Hgb                       14.9 gm/dL                             Hct                       43.7 %                             Platelet                  165 x10(3)/mcL                             MCV                       93.2 fL                             MCH                       31.8 pg  HI                             MCHC                      34.1 gm/dL                             RDW                       13.0 %                             MPV                       10.0 fL                             Abs Neut                  1.53 x10(3)/mcL  LOW                             NEUT%                     47.6 %  NA                             NEUT#                     1.5 x10(3)/mcL  LOW                             LYMPH%                    35.4 %  NA                             LYMPH#                    1.1 x10(3)/mcL                             MONO%                     12.1 %  NA                             MONO#                     0.4 x10(3)/mcL                             EOS%                      4.3 %  NA                             EOS#                      0.1 x10(3)/mcL                             BASO%                     0.6 %  NA                             BASO#                     0.0 x10(3)/mcL  .      Date  WBC's  7/2/13 2.7  6/25/12 3.3  6/6/11 3.4  11/3/04 3.2      Impression and Plan   Diagnosis     Prostate cancer (XNZ84-FQ C61).     Chronic leukopenia (GPH83-YK D72.819).       IMPRESSION:    288.00-NEUTROPENIA, UNSPECIFIED --stable  185    - Prostate Cancer    PLAN:    Patient has an extensive workup back in 2004 and his diagnosis was chronic benign neutropenia. Patient blood counts remains relatively stable. His most recent WBC's  revealed a WBC's of 3.2K.  I discussed with him bone marrow biopsy again and that I repeat I any  abnormality in his WBC's. He will continue yearly followup and bone marrow biopsy will be indicated if any major changes in his blood counts. All questions were answered.    Cont followup with his Urologist, Dr Suzanne VILLARREAL 1 yr with a CBC, CMP   Call sooner if needed    YANIRA PELAYO MD

## 2022-04-30 NOTE — OP NOTE
Patient:   Yonathan Gonzales Sr            MRN: [id number]            FIN: [id number]               Age:   75 years     Sex:  Male     :  1945   Associated Diagnoses:   None   Author:   Moy Mane MD      PREOPERATIVE DIAGNOSIS: Cataract Right eye    POSTOPERATIVE DIAGNOSIS: Cataract Right eye    PROCEDURE: Phacoemulsification with intraocular lens implantations Right eye    SURGEON: Moy Mane MD    ASSISTANT: Allie Dye [facility]    ANESTHEISA: MAC    COMPLICATIONS: NONE    DESCRIPTION OF PROCEDURE: The patient was brought into the operating suite, where the patient was correctly identified as was the operative eye via timeout.  The patient was marked at the axis of 0 and 180 degrees then prepped and draped in a sterile ophthalmic fashion.  A lid speculum was placed in the operative eye and the microscope was brought into place.  The eye was then marked using a axis marker for the desired position of the IOL implant.  A 1.0 mm paracentesis was then made at (12) o'clock.  The anterior chamber was filled with Endocoat.  A (temporal) clear corneal incision was made with a 2.4 mm keratome.  A 6.0 mm corneal marking ring was used to wendi the cornea centered over the visual axis.  A 5.00 mm continuous curvilinear capsulorhexis was fashioned using a cystotome and microcapsular forceps.  Hydrodissection and hydrodelineation was performed with unpreserved 1% Xylocaine.  The nucleus was then phacoemulsified with the Abbott phacoemulsification hand-piece with a total of (10) EFX.  The cortex was then removed with the I/A hand-piece.  The capsular bag was filled with Helon.  The lens model ([id number]) with the power of (17.5) was placed in the capsular bag at (87) degrees.  The Helon was then removed from the eye with the I/A hand -piece.  The anterior chamber was inflated and the wounds were hydrated with BSS.  The wounds were checked with Weck-Sherita sponges and found to be watertight.  The lid speculum was  removed and topical antibiotics were placed on the operative eye.  The patient was brought to the PACU in good condition.

## 2022-04-30 NOTE — OP NOTE
Patient:   Yonathan Gonzales Sr            MRN: [id number]            FIN: [id number]               Age:   75 years     Sex:  Male     :  1945   Associated Diagnoses:   None   Author:   Moy Mane MD      PREOPERATIVE DIAGNOSIS: Cataract Left eye    POSTOPERATIVE DIAGNOSIS: Cataract Left eye    PROCEDURE: Phacoemulsification with intraocular lens implantations Left eye    SURGEON: Moy Mane MD    ASSISTANT:  Allie Dye [facility]    ANESTHEISA: MAC    COMPLICATIONS: NONE    DESCRIPTION OF PROCEDURE: The patient was brought into the operating suite, where the patient was correctly identified as was the operative eye via timeout.  The patient was marked at the axis of 0 and 180 degrees then prepped and draped in a sterile ophthalmic fashion.  A lid speculum was placed in the operative eye and the microscope was brought into place.  The eye was then marked using a axis marker for the desired position of the IOL implant.  A 1.0 mm paracentesis was then made at (6) o'clock.  The anterior chamber was filled with Endocoat.  A (superior) clear corneal incision was made with a 2.4 mm keratome.  A 6.0 mm corneal marking ring was used to wendi the cornea centered over the visual axis.  A 5.00 mm continuous curvilinear capsulorhexis was fashioned using a cystotome and microcapsular forceps.  Hydrodissection and hydrodelineation was performed with unpreserved 1% Xylocaine.  The nucleus was then phacoemulsified with the Abbott phacoemulsification hand-piece with a total of (4) EFX.  The cortex was then removed with the I/A hand-piece.  The capsular bag was filled with Helon.  The lens model ([id number]) with the power of (17.0) was placed in the capsular bag at (90) degrees.  The Helon was then removed from the eye with the I/A hand -piece.   The anterior chamber was inflated and the wounds were hydrated with BSS.  The wounds were checked with Weck-Sherita sponges and found to be watertight.  The lid speculum was removed  and topical antibiotics were placed on the operative eye.  The patient was brought to the PACU in good condition.

## 2022-07-01 DIAGNOSIS — N18.2 CHRONIC KIDNEY DISEASE, STAGE II (MILD): Primary | ICD-10-CM

## 2022-07-01 DIAGNOSIS — E21.1 SECONDARY HYPERPARATHYROIDISM, NOT ELSEWHERE CLASSIFIED: ICD-10-CM

## 2022-07-05 ENCOUNTER — LAB VISIT (OUTPATIENT)
Dept: LAB | Facility: HOSPITAL | Age: 77
End: 2022-07-05
Attending: NURSE PRACTITIONER
Payer: MEDICARE

## 2022-07-05 DIAGNOSIS — I12.9 PARENCHYMAL RENAL HYPERTENSION: ICD-10-CM

## 2022-07-05 DIAGNOSIS — E11.22 TYPE 2 DIABETES MELLITUS WITH END-STAGE RENAL DISEASE: Primary | ICD-10-CM

## 2022-07-05 DIAGNOSIS — N18.2 CHRONIC KIDNEY DISEASE, STAGE II (MILD): ICD-10-CM

## 2022-07-05 DIAGNOSIS — N18.6 TYPE 2 DIABETES MELLITUS WITH END-STAGE RENAL DISEASE: Primary | ICD-10-CM

## 2022-07-05 LAB
ALBUMIN SERPL-MCNC: 3.9 GM/DL (ref 3.4–4.8)
ALBUMIN/GLOB SERPL: 1.5 RATIO (ref 1.1–2)
ALP SERPL-CCNC: 71 UNIT/L (ref 40–150)
ALT SERPL-CCNC: 26 UNIT/L (ref 0–55)
AST SERPL-CCNC: 21 UNIT/L (ref 5–34)
BASOPHILS # BLD AUTO: 0.02 X10(3)/MCL (ref 0–0.2)
BASOPHILS NFR BLD AUTO: 0.5 %
BILIRUBIN DIRECT+TOT PNL SERPL-MCNC: 0.8 MG/DL
BUN SERPL-MCNC: 15.1 MG/DL (ref 8.4–25.7)
CALCIUM SERPL-MCNC: 9.8 MG/DL (ref 8.8–10)
CHLORIDE SERPL-SCNC: 105 MMOL/L (ref 98–107)
CO2 SERPL-SCNC: 26 MMOL/L (ref 23–31)
CREAT SERPL-MCNC: 0.94 MG/DL (ref 0.73–1.18)
EOSINOPHIL # BLD AUTO: 0.14 X10(3)/MCL (ref 0–0.9)
EOSINOPHIL NFR BLD AUTO: 3.7 %
ERYTHROCYTE [DISTWIDTH] IN BLOOD BY AUTOMATED COUNT: 13 % (ref 11.5–17)
GLOBULIN SER-MCNC: 2.6 GM/DL (ref 2.4–3.5)
GLUCOSE SERPL-MCNC: 117 MG/DL (ref 82–115)
HCT VFR BLD AUTO: 41.4 % (ref 42–52)
HGB BLD-MCNC: 13.9 GM/DL (ref 14–18)
IMM GRANULOCYTES # BLD AUTO: 0 X10(3)/MCL (ref 0–0.04)
IMM GRANULOCYTES NFR BLD AUTO: 0 %
LYMPHOCYTES # BLD AUTO: 1.05 X10(3)/MCL (ref 0.6–4.6)
LYMPHOCYTES NFR BLD AUTO: 27.8 %
MCH RBC QN AUTO: 31.5 PG (ref 27–31)
MCHC RBC AUTO-ENTMCNC: 33.6 MG/DL (ref 33–36)
MCV RBC AUTO: 93.9 FL (ref 80–94)
MONOCYTES # BLD AUTO: 0.48 X10(3)/MCL (ref 0.1–1.3)
MONOCYTES NFR BLD AUTO: 12.7 %
NEUTROPHILS # BLD AUTO: 2.1 X10(3)/MCL (ref 2.1–9.2)
NEUTROPHILS NFR BLD AUTO: 55.3 %
PLATELET # BLD AUTO: 169 X10(3)/MCL (ref 130–400)
PMV BLD AUTO: 9.5 FL (ref 7.4–10.4)
POTASSIUM SERPL-SCNC: 3.8 MMOL/L (ref 3.5–5.1)
PROT SERPL-MCNC: 6.5 GM/DL (ref 5.8–7.6)
RBC # BLD AUTO: 4.41 X10(6)/MCL (ref 4.7–6.1)
SODIUM SERPL-SCNC: 140 MMOL/L (ref 136–145)
WBC # SPEC AUTO: 3.8 X10(3)/MCL (ref 4.5–11.5)

## 2022-07-05 PROCEDURE — 36415 COLL VENOUS BLD VENIPUNCTURE: CPT

## 2022-07-05 PROCEDURE — 85025 COMPLETE CBC W/AUTO DIFF WBC: CPT

## 2022-07-05 PROCEDURE — 80053 COMPREHEN METABOLIC PANEL: CPT

## 2022-07-11 ENCOUNTER — HOSPITAL ENCOUNTER (OUTPATIENT)
Dept: RADIOLOGY | Facility: HOSPITAL | Age: 77
Discharge: HOME | End: 2022-07-11
Attending: NURSE PRACTITIONER
Payer: MEDICARE

## 2022-07-11 DIAGNOSIS — N18.2 CHRONIC KIDNEY DISEASE, STAGE II (MILD): ICD-10-CM

## 2022-07-11 DIAGNOSIS — E21.1 SECONDARY HYPERPARATHYROIDISM, NOT ELSEWHERE CLASSIFIED: ICD-10-CM

## 2022-07-11 PROCEDURE — 76770 US EXAM ABDO BACK WALL COMP: CPT | Mod: TC

## 2022-07-18 ENCOUNTER — TELEPHONE (OUTPATIENT)
Dept: PHYSICAL MEDICINE AND REHAB | Facility: CLINIC | Age: 77
End: 2022-07-18
Payer: MEDICARE

## 2022-07-18 NOTE — TELEPHONE ENCOUNTER
----- Message from Sagarrio Mccabe sent at 7/18/2022 10:53 AM CDT -----  Contact: patient  Patient needs to speak to someone about a pain in in his back and lower left leg, please call him back at [phone]

## 2023-01-09 ENCOUNTER — LAB VISIT (OUTPATIENT)
Dept: LAB | Facility: HOSPITAL | Age: 78
End: 2023-01-09
Payer: MEDICARE

## 2023-01-09 DIAGNOSIS — E21.3 HYPERPARATHYROIDISM, UNSPECIFIED: ICD-10-CM

## 2023-01-09 DIAGNOSIS — N18.6 TYPE 2 DIABETES MELLITUS WITH END-STAGE RENAL DISEASE: Primary | ICD-10-CM

## 2023-01-09 DIAGNOSIS — E11.22 TYPE 2 DIABETES MELLITUS WITH END-STAGE RENAL DISEASE: Primary | ICD-10-CM

## 2023-01-09 DIAGNOSIS — N18.2 CHRONIC KIDNEY DISEASE, STAGE II (MILD): ICD-10-CM

## 2023-01-09 DIAGNOSIS — I12.9 PARENCHYMAL RENAL HYPERTENSION: ICD-10-CM

## 2023-01-09 DIAGNOSIS — R80.9 PROTEINURIA, UNSPECIFIED TYPE: ICD-10-CM

## 2023-01-09 LAB
ALBUMIN SERPL-MCNC: 4.1 G/DL (ref 3.4–4.8)
ALBUMIN/GLOB SERPL: 1.4 RATIO (ref 1.1–2)
ALP SERPL-CCNC: 80 UNIT/L (ref 40–150)
ALT SERPL-CCNC: 20 UNIT/L (ref 0–55)
AST SERPL-CCNC: 19 UNIT/L (ref 5–34)
BASOPHILS # BLD AUTO: 0.01 X10(3)/MCL (ref 0–0.2)
BASOPHILS NFR BLD AUTO: 0.2 %
BILIRUBIN DIRECT+TOT PNL SERPL-MCNC: 0.5 MG/DL
BUN SERPL-MCNC: 20.5 MG/DL (ref 8.4–25.7)
CALCIUM SERPL-MCNC: 10 MG/DL (ref 8.8–10)
CHLORIDE SERPL-SCNC: 103 MMOL/L (ref 98–107)
CO2 SERPL-SCNC: 29 MMOL/L (ref 23–31)
CREAT SERPL-MCNC: 1.18 MG/DL (ref 0.73–1.18)
CREAT UR-MCNC: 112.7 MG/DL (ref 63–166)
DEPRECATED CALCIDIOL+CALCIFEROL SERPL-MC: 54.4 NG/ML (ref 30–80)
EOSINOPHIL # BLD AUTO: 0.2 X10(3)/MCL (ref 0–0.9)
EOSINOPHIL NFR BLD AUTO: 4.8 %
ERYTHROCYTE [DISTWIDTH] IN BLOOD BY AUTOMATED COUNT: 13.1 % (ref 11.6–14.4)
GFR SERPLBLD CREATININE-BSD FMLA CKD-EPI: 64 MLS/MIN/1.73/M2
GLOBULIN SER-MCNC: 3 GM/DL (ref 2.4–3.5)
GLUCOSE SERPL-MCNC: 113 MG/DL (ref 82–115)
HCT VFR BLD AUTO: 44.4 % (ref 42–52)
HGB BLD-MCNC: 14.3 GM/DL (ref 14–18)
IMM GRANULOCYTES # BLD AUTO: 0 X10(3)/MCL (ref 0–0.04)
IMM GRANULOCYTES NFR BLD AUTO: 0 %
LYMPHOCYTES # BLD AUTO: 1.37 X10(3)/MCL (ref 0.6–4.6)
LYMPHOCYTES NFR BLD AUTO: 32.6 %
MAGNESIUM SERPL-MCNC: 2 MG/DL (ref 1.6–2.6)
MCH RBC QN AUTO: 30.4 PG
MCHC RBC AUTO-ENTMCNC: 32.2 MG/DL (ref 33–36)
MCV RBC AUTO: 94.3 FL (ref 80–94)
MONOCYTES # BLD AUTO: 0.37 X10(3)/MCL (ref 0.1–1.3)
MONOCYTES NFR BLD AUTO: 8.8 %
NEUTROPHILS # BLD AUTO: 2.25 X10(3)/MCL (ref 2.1–9.2)
NEUTROPHILS NFR BLD AUTO: 53.6 %
PHOSPHATE SERPL-MCNC: 3.6 MG/DL (ref 2.3–4.7)
PLATELET # BLD AUTO: 182 X10(3)/MCL (ref 140–371)
PMV BLD AUTO: 9.5 FL (ref 9.4–12.4)
POTASSIUM SERPL-SCNC: 4 MMOL/L (ref 3.5–5.1)
PROT SERPL-MCNC: 7.1 GM/DL (ref 5.8–7.6)
PROT UR STRIP-MCNC: <6.8 MG/DL
PTH-INTACT SERPL-MCNC: 62 PG/ML (ref 8.7–77)
RBC # BLD AUTO: 4.71 X10(6)/MCL (ref 4.7–6.1)
SODIUM SERPL-SCNC: 141 MMOL/L (ref 136–145)
WBC # SPEC AUTO: 4.2 X10(3)/MCL (ref 4.5–11.5)

## 2023-01-09 PROCEDURE — 82306 VITAMIN D 25 HYDROXY: CPT

## 2023-01-09 PROCEDURE — 84100 ASSAY OF PHOSPHORUS: CPT

## 2023-01-09 PROCEDURE — 83970 ASSAY OF PARATHORMONE: CPT

## 2023-01-09 PROCEDURE — 83735 ASSAY OF MAGNESIUM: CPT

## 2023-01-09 PROCEDURE — 85025 COMPLETE CBC W/AUTO DIFF WBC: CPT

## 2023-01-09 PROCEDURE — 82570 ASSAY OF URINE CREATININE: CPT

## 2023-01-09 PROCEDURE — 36415 COLL VENOUS BLD VENIPUNCTURE: CPT

## 2023-01-09 PROCEDURE — 80053 COMPREHEN METABOLIC PANEL: CPT

## 2023-07-13 ENCOUNTER — LAB VISIT (OUTPATIENT)
Dept: LAB | Facility: HOSPITAL | Age: 78
End: 2023-07-13
Payer: MEDICARE

## 2023-07-13 DIAGNOSIS — I12.9 PARENCHYMAL RENAL HYPERTENSION: ICD-10-CM

## 2023-07-13 DIAGNOSIS — R80.9 PROTEINURIA, UNSPECIFIED TYPE: ICD-10-CM

## 2023-07-13 DIAGNOSIS — N18.2 CHRONIC KIDNEY DISEASE, STAGE II (MILD): Primary | ICD-10-CM

## 2023-07-13 LAB
ALBUMIN SERPL-MCNC: 4 G/DL (ref 3.4–4.8)
ALBUMIN/GLOB SERPL: 1.5 RATIO (ref 1.1–2)
ALP SERPL-CCNC: 79 UNIT/L (ref 40–150)
ALT SERPL-CCNC: 21 UNIT/L (ref 0–55)
APPEARANCE UR: CLEAR
AST SERPL-CCNC: 18 UNIT/L (ref 5–34)
BACTERIA #/AREA URNS AUTO: NORMAL /HPF
BASOPHILS # BLD AUTO: 0.02 X10(3)/MCL
BASOPHILS NFR BLD AUTO: 0.6 %
BILIRUB UR QL STRIP.AUTO: NEGATIVE
BILIRUBIN DIRECT+TOT PNL SERPL-MCNC: 0.8 MG/DL
BUN SERPL-MCNC: 17.8 MG/DL (ref 8.4–25.7)
CALCIUM SERPL-MCNC: 9.6 MG/DL (ref 8.8–10)
CHLORIDE SERPL-SCNC: 105 MMOL/L (ref 98–107)
CO2 SERPL-SCNC: 29 MMOL/L (ref 23–31)
COLOR UR: YELLOW
CREAT SERPL-MCNC: 1.02 MG/DL (ref 0.73–1.18)
EOSINOPHIL # BLD AUTO: 0.14 X10(3)/MCL (ref 0–0.9)
EOSINOPHIL NFR BLD AUTO: 4.3 %
ERYTHROCYTE [DISTWIDTH] IN BLOOD BY AUTOMATED COUNT: 12.7 % (ref 11.5–17)
GFR SERPLBLD CREATININE-BSD FMLA CKD-EPI: >60 MLS/MIN/1.73/M2
GLOBULIN SER-MCNC: 2.6 GM/DL (ref 2.4–3.5)
GLUCOSE SERPL-MCNC: 161 MG/DL (ref 82–115)
GLUCOSE UR QL STRIP.AUTO: NEGATIVE
HCT VFR BLD AUTO: 44.1 % (ref 42–52)
HGB BLD-MCNC: 14.5 G/DL (ref 14–18)
IMM GRANULOCYTES # BLD AUTO: 0 X10(3)/MCL (ref 0–0.04)
IMM GRANULOCYTES NFR BLD AUTO: 0 %
KETONES UR QL STRIP.AUTO: NEGATIVE
LEUKOCYTE ESTERASE UR QL STRIP.AUTO: NEGATIVE
LYMPHOCYTES # BLD AUTO: 1.28 X10(3)/MCL (ref 0.6–4.6)
LYMPHOCYTES NFR BLD AUTO: 39.1 %
MCH RBC QN AUTO: 31 PG (ref 27–31)
MCHC RBC AUTO-ENTMCNC: 32.9 G/DL (ref 33–36)
MCV RBC AUTO: 94.2 FL (ref 80–94)
MONOCYTES # BLD AUTO: 0.41 X10(3)/MCL (ref 0.1–1.3)
MONOCYTES NFR BLD AUTO: 12.5 %
NEUTROPHILS # BLD AUTO: 1.42 X10(3)/MCL (ref 2.1–9.2)
NEUTROPHILS NFR BLD AUTO: 43.5 %
NITRITE UR QL STRIP.AUTO: NEGATIVE
PH UR STRIP.AUTO: 7.5 [PH]
PLATELET # BLD AUTO: 168 X10(3)/MCL (ref 130–400)
PMV BLD AUTO: 9.8 FL (ref 7.4–10.4)
POTASSIUM SERPL-SCNC: 4.1 MMOL/L (ref 3.5–5.1)
PROT SERPL-MCNC: 6.6 GM/DL (ref 5.8–7.6)
PROT UR QL STRIP.AUTO: NEGATIVE
RBC # BLD AUTO: 4.68 X10(6)/MCL (ref 4.7–6.1)
RBC #/AREA URNS AUTO: NORMAL /HPF
RBC UR QL AUTO: NEGATIVE
SODIUM SERPL-SCNC: 143 MMOL/L (ref 136–145)
SP GR UR STRIP.AUTO: 1.01
SQUAMOUS #/AREA URNS AUTO: NORMAL /HPF
UROBILINOGEN UR STRIP-ACNC: 0.2
WBC # SPEC AUTO: 3.27 X10(3)/MCL (ref 4.5–11.5)
WBC #/AREA URNS AUTO: NORMAL /HPF

## 2023-07-13 PROCEDURE — 80053 COMPREHEN METABOLIC PANEL: CPT

## 2023-07-13 PROCEDURE — 81001 URINALYSIS AUTO W/SCOPE: CPT

## 2023-07-13 PROCEDURE — 85025 COMPLETE CBC W/AUTO DIFF WBC: CPT

## 2023-07-13 PROCEDURE — 36415 COLL VENOUS BLD VENIPUNCTURE: CPT

## 2023-07-24 DIAGNOSIS — N18.2 CHRONIC KIDNEY DISEASE, STAGE II (MILD): Primary | ICD-10-CM

## 2023-07-24 DIAGNOSIS — N18.6 TYPE 2 DIABETES MELLITUS WITH END-STAGE RENAL DISEASE: ICD-10-CM

## 2023-07-24 DIAGNOSIS — I12.9 PARENCHYMAL RENAL HYPERTENSION: ICD-10-CM

## 2023-07-24 DIAGNOSIS — E11.22 TYPE 2 DIABETES MELLITUS WITH END-STAGE RENAL DISEASE: ICD-10-CM

## 2024-03-11 ENCOUNTER — HOSPITAL ENCOUNTER (OUTPATIENT)
Dept: RADIOLOGY | Facility: HOSPITAL | Age: 79
Discharge: HOME | End: 2024-03-11
Attending: NURSE PRACTITIONER
Payer: MEDICARE

## 2024-03-11 DIAGNOSIS — N18.6 TYPE 2 DIABETES MELLITUS WITH END-STAGE RENAL DISEASE: ICD-10-CM

## 2024-03-11 DIAGNOSIS — E11.22 TYPE 2 DIABETES MELLITUS WITH END-STAGE RENAL DISEASE: ICD-10-CM

## 2024-03-11 DIAGNOSIS — N18.2 CHRONIC KIDNEY DISEASE, STAGE II (MILD): ICD-10-CM

## 2024-03-11 DIAGNOSIS — I12.9 PARENCHYMAL RENAL HYPERTENSION: ICD-10-CM

## 2024-03-11 PROCEDURE — 76770 US EXAM ABDO BACK WALL COMP: CPT | Mod: TC

## 2025-03-18 ENCOUNTER — LAB VISIT (OUTPATIENT)
Dept: LAB | Facility: HOSPITAL | Age: 80
End: 2025-03-18
Attending: STUDENT IN AN ORGANIZED HEALTH CARE EDUCATION/TRAINING PROGRAM
Payer: MEDICARE

## 2025-03-18 DIAGNOSIS — N18.2 CHRONIC KIDNEY DISEASE, STAGE II (MILD): Primary | ICD-10-CM

## 2025-03-18 DIAGNOSIS — E78.5 HYPERLIPIDEMIA, UNSPECIFIED HYPERLIPIDEMIA TYPE: ICD-10-CM

## 2025-03-18 DIAGNOSIS — E11.22 TYPE 2 DIABETES MELLITUS WITH END-STAGE RENAL DISEASE: ICD-10-CM

## 2025-03-18 DIAGNOSIS — N18.6 TYPE 2 DIABETES MELLITUS WITH END-STAGE RENAL DISEASE: ICD-10-CM

## 2025-03-18 DIAGNOSIS — I12.9 PARENCHYMAL RENAL HYPERTENSION: ICD-10-CM

## 2025-03-18 LAB
ALBUMIN SERPL-MCNC: 3.9 G/DL (ref 3.4–4.8)
BUN SERPL-MCNC: 17.7 MG/DL (ref 8.4–25.7)
CALCIUM SERPL-MCNC: 9.5 MG/DL (ref 8.8–10)
CHLORIDE SERPL-SCNC: 104 MMOL/L (ref 98–107)
CO2 SERPL-SCNC: 26 MMOL/L (ref 23–31)
CREAT SERPL-MCNC: 0.99 MG/DL (ref 0.72–1.25)
ERYTHROCYTE [DISTWIDTH] IN BLOOD BY AUTOMATED COUNT: 12.8 % (ref 11.5–17)
GFR SERPLBLD CREATININE-BSD FMLA CKD-EPI: >60 ML/MIN/1.73/M2
GLUCOSE SERPL-MCNC: 134 MG/DL (ref 82–115)
HCT VFR BLD AUTO: 43 % (ref 42–52)
HGB BLD-MCNC: 14.4 G/DL (ref 14–18)
MCH RBC QN AUTO: 31.9 PG (ref 27–31)
MCHC RBC AUTO-ENTMCNC: 33.5 G/DL (ref 33–36)
MCV RBC AUTO: 95.3 FL (ref 80–94)
PHOSPHATE SERPL-MCNC: 3.1 MG/DL (ref 2.3–4.7)
PLATELET # BLD AUTO: 154 X10(3)/MCL (ref 130–400)
PMV BLD AUTO: 9.8 FL (ref 7.4–10.4)
POTASSIUM SERPL-SCNC: 3.8 MMOL/L (ref 3.5–5.1)
RBC # BLD AUTO: 4.51 X10(6)/MCL (ref 4.7–6.1)
SODIUM SERPL-SCNC: 138 MMOL/L (ref 136–145)
WBC # BLD AUTO: 3.94 X10(3)/MCL (ref 4.5–11.5)

## 2025-03-18 PROCEDURE — 36415 COLL VENOUS BLD VENIPUNCTURE: CPT

## 2025-03-18 PROCEDURE — 85027 COMPLETE CBC AUTOMATED: CPT

## 2025-03-18 PROCEDURE — 80069 RENAL FUNCTION PANEL: CPT

## 2025-09-03 ENCOUNTER — OFFICE VISIT (OUTPATIENT)
Dept: HEMATOLOGY/ONCOLOGY | Facility: CLINIC | Age: 80
End: 2025-09-03
Payer: MEDICARE

## 2025-09-03 VITALS
TEMPERATURE: 98 F | HEART RATE: 60 BPM | RESPIRATION RATE: 18 BRPM | BODY MASS INDEX: 36.64 KG/M2 | HEIGHT: 74 IN | SYSTOLIC BLOOD PRESSURE: 177 MMHG | DIASTOLIC BLOOD PRESSURE: 91 MMHG | OXYGEN SATURATION: 97 % | WEIGHT: 285.5 LBS

## 2025-09-03 DIAGNOSIS — D75.89 MACROCYTOSIS WITHOUT ANEMIA: ICD-10-CM

## 2025-09-03 DIAGNOSIS — R71.8 OTHER ABNORMALITY OF RED BLOOD CELLS: ICD-10-CM

## 2025-09-03 DIAGNOSIS — Z85.46 H/O PROSTATE CANCER: ICD-10-CM

## 2025-09-03 DIAGNOSIS — D72.819 LEUKOPENIA, UNSPECIFIED TYPE: Primary | ICD-10-CM

## 2025-09-03 PROCEDURE — 99215 OFFICE O/P EST HI 40 MIN: CPT | Mod: PBBFAC | Performed by: INTERNAL MEDICINE

## 2025-09-03 PROCEDURE — 99203 OFFICE O/P NEW LOW 30 MIN: CPT | Mod: S$PBB,,, | Performed by: INTERNAL MEDICINE

## 2025-09-03 PROCEDURE — 99999 PR PBB SHADOW E&M-EST. PATIENT-LVL V: CPT | Mod: PBBFAC,,, | Performed by: INTERNAL MEDICINE

## 2025-09-03 RX ORDER — ROSUVASTATIN CALCIUM 20 MG/1
20 TABLET, COATED ORAL
COMMUNITY

## 2025-09-03 RX ORDER — METFORMIN HYDROCHLORIDE 500 MG/1
TABLET ORAL
COMMUNITY

## 2025-09-03 RX ORDER — NIACIN (INOSITOL NIACINATE) 400(500MG)
100 CAPSULE ORAL
COMMUNITY

## 2025-09-03 RX ORDER — HYDROCHLOROTHIAZIDE 25 MG/1
TABLET ORAL
COMMUNITY

## 2025-09-03 RX ORDER — IBUPROFEN 100 MG/5ML
SUSPENSION, ORAL (FINAL DOSE FORM) ORAL
COMMUNITY

## 2025-09-03 RX ORDER — ALLOPURINOL 300 MG/1
300 TABLET ORAL
COMMUNITY

## 2025-09-03 RX ORDER — LINACLOTIDE 145 UG/1
145 CAPSULE, GELATIN COATED ORAL
COMMUNITY

## 2025-09-03 RX ORDER — LOSARTAN POTASSIUM 25 MG/1
TABLET ORAL
COMMUNITY